# Patient Record
Sex: MALE | Race: WHITE | NOT HISPANIC OR LATINO | Employment: FULL TIME | URBAN - METROPOLITAN AREA
[De-identification: names, ages, dates, MRNs, and addresses within clinical notes are randomized per-mention and may not be internally consistent; named-entity substitution may affect disease eponyms.]

---

## 2021-09-28 ENCOUNTER — NON-PROVIDER VISIT (OUTPATIENT)
Dept: OCCUPATIONAL MEDICINE | Facility: CLINIC | Age: 26
End: 2021-09-28

## 2021-09-28 DIAGNOSIS — Z02.1 PRE-EMPLOYMENT HEALTH SCREENING EXAMINATION: ICD-10-CM

## 2021-09-28 PROCEDURE — 8907 PR URINE COLLECT ONLY: Performed by: NURSE PRACTITIONER

## 2021-09-29 ENCOUNTER — OFFICE VISIT (OUTPATIENT)
Dept: OCCUPATIONAL MEDICINE | Facility: CLINIC | Age: 26
End: 2021-09-29

## 2021-09-29 VITALS
DIASTOLIC BLOOD PRESSURE: 78 MMHG | HEIGHT: 71 IN | SYSTOLIC BLOOD PRESSURE: 118 MMHG | HEART RATE: 86 BPM | WEIGHT: 200 LBS | RESPIRATION RATE: 16 BRPM | BODY MASS INDEX: 28 KG/M2

## 2021-09-29 DIAGNOSIS — Z02.4 ENCOUNTER FOR COMMERCIAL DRIVER MEDICAL EXAMINATION (CDME): ICD-10-CM

## 2021-09-29 PROCEDURE — 7100 PR DOT PHYSICAL: Performed by: NURSE PRACTITIONER

## 2021-09-29 NOTE — PROGRESS NOTES
[facility] No major medical history, no chronic conditions, no chronic medications. No history of asthma, heart disease, seizure disorder or syncopal episodes with activity. Please see the chart for further information, however cleared for 's license for 2 years without restrictions.

## 2022-04-01 ENCOUNTER — OFFICE VISIT (OUTPATIENT)
Dept: URGENT CARE | Facility: PHYSICIAN GROUP | Age: 27
End: 2022-04-01
Payer: COMMERCIAL

## 2022-04-01 VITALS
HEART RATE: 87 BPM | SYSTOLIC BLOOD PRESSURE: 128 MMHG | TEMPERATURE: 98.7 F | HEIGHT: 71 IN | WEIGHT: 200 LBS | DIASTOLIC BLOOD PRESSURE: 82 MMHG | OXYGEN SATURATION: 100 % | RESPIRATION RATE: 18 BRPM | BODY MASS INDEX: 28 KG/M2

## 2022-04-01 DIAGNOSIS — R50.9 FEVER, UNSPECIFIED FEVER CAUSE: ICD-10-CM

## 2022-04-01 DIAGNOSIS — J02.9 SORE THROAT: ICD-10-CM

## 2022-04-01 DIAGNOSIS — J02.0 STREP THROAT: Primary | ICD-10-CM

## 2022-04-01 PROCEDURE — 99203 OFFICE O/P NEW LOW 30 MIN: CPT | Performed by: PHYSICIAN ASSISTANT

## 2022-04-01 RX ORDER — AMOXICILLIN 500 MG/1
500 CAPSULE ORAL 2 TIMES DAILY
Qty: 20 CAPSULE | Refills: 0 | Status: SHIPPED | OUTPATIENT
Start: 2022-04-01 | End: 2022-04-11

## 2022-04-01 ASSESSMENT — ENCOUNTER SYMPTOMS
EYE DISCHARGE: 0
SWOLLEN GLANDS: 1
VOMITING: 0
SHORTNESS OF BREATH: 0
DIZZINESS: 1
COUGH: 1
FEVER: 0
CHILLS: 0
MYALGIAS: 1
DIARRHEA: 0
SPUTUM PRODUCTION: 1
NAUSEA: 0
SORE THROAT: 1
HEADACHES: 0

## 2022-04-01 NOTE — PROGRESS NOTES
Subjective     Giuseppe Asencio is a 26 y.o. male who presents with Pharyngitis and Cough (Sore throat fever 2 days ago 100.0f, dry cough irritating throat. Mucus in nose bright to dark yellow .)    Medications:    • Motrin    Allergies: Patient has no known allergies.    Problem List: Giuseppe Asencio does not have a problem list on file.    Surgical History:  No past surgical history on file.    Past Social Hx: Giuseppe Asencio  reports that he has been smoking. He has never used smokeless tobacco.     Past Family Hx:  Giuseppe Asencio family history is not on file.     Problem list, medications, and allergies reviewed by myself today in Epic.          Patient presents with:  Pharyngitis  Cough: Sore throat fever 2 days ago 100.0f, dry cough irritating throat. Mucus in nose bright to dark yellow .  Negative covid test.        Pharyngitis   This is a new problem. Episode onset: 2 days. The problem has been gradually worsening. The maximum temperature recorded prior to his arrival was 100.4 - 100.9 F. The fever has been present for 1 to 2 days. The pain is at a severity of 8/10. Associated symptoms include congestion, coughing, a plugged ear sensation and swollen glands. Pertinent negatives include no diarrhea, headaches, shortness of breath or vomiting. He has had exposure to strep. He has tried cool liquids, NSAIDs and gargles for the symptoms. The treatment provided no relief.       Review of Systems   Constitutional: Negative for chills and fever.   HENT: Positive for congestion and sore throat.    Eyes: Negative for discharge.   Respiratory: Positive for cough and sputum production. Negative for shortness of breath.    Cardiovascular: Negative for chest pain.   Gastrointestinal: Negative for diarrhea, nausea and vomiting.   Musculoskeletal: Positive for myalgias.   Skin: Negative for rash.   Neurological: Positive for dizziness. Negative for headaches.   All other systems reviewed and are  "negative.             Objective     /82 (BP Location: Left arm, Patient Position: Sitting, BP Cuff Size: Adult)   Pulse 87   Temp 37.1 °C (98.7 °F) (Temporal)   Resp 18   Ht 1.803 m (5' 11\")   Wt 90.7 kg (200 lb)   SpO2 100%   BMI 27.89 kg/m²      Physical Exam  Vitals and nursing note reviewed.   Constitutional:       General: He is not in acute distress.     Appearance: Normal appearance. He is well-developed and normal weight. He is not ill-appearing or toxic-appearing.   HENT:      Head: Normocephalic and atraumatic.      Right Ear: Tympanic membrane normal.      Left Ear: Tympanic membrane normal.      Nose: Mucosal edema, congestion and rhinorrhea present.      Mouth/Throat:      Lips: Pink.      Mouth: Mucous membranes are moist.      Pharynx: Uvula midline. Posterior oropharyngeal erythema present. No uvula swelling.   Eyes:      Extraocular Movements: Extraocular movements intact.      Conjunctiva/sclera: Conjunctivae normal.      Pupils: Pupils are equal, round, and reactive to light.   Cardiovascular:      Rate and Rhythm: Normal rate and regular rhythm.      Pulses: Normal pulses.      Heart sounds: Normal heart sounds.   Pulmonary:      Effort: Pulmonary effort is normal.      Breath sounds: Normal breath sounds. No decreased breath sounds.   Abdominal:      Palpations: Abdomen is soft.   Musculoskeletal:         General: Normal range of motion.      Cervical back: Normal range of motion and neck supple.   Lymphadenopathy:      Cervical: No cervical adenopathy.   Skin:     General: Skin is warm and dry.      Capillary Refill: Capillary refill takes less than 2 seconds.   Neurological:      General: No focal deficit present.      Mental Status: He is alert and oriented to person, place, and time.      Gait: Gait normal.   Psychiatric:         Mood and Affect: Mood normal.         Behavior: Behavior is cooperative.            Strep: positive           Assessment & Plan             1. Strep " throat  amoxicillin (AMOXIL) 500 MG Cap    POCT Rapid Strep A   2. Sore throat  amoxicillin (AMOXIL) 500 MG Cap    POCT Rapid Strep A   3. Fever, unspecified fever cause  amoxicillin (AMOXIL) 500 MG Cap    POCT Rapid Strep A     Patient was evaluated in clinic today while wearing appropriate personal protective equipment.      PT to begin prescription medications today as discussed for strep throat.     PT advised saltwater gargles/swishes  3-4 times daily until symptoms improve.     Motrin/Advil/Ibuprophen 600 mg every 6 hours as needed for pain or fever.    PT should follow up with PCP in 1-2 days for re-evaluation if symptoms have not improved.      Discussed red flags and reasons to return to UC or ED.      Pt and/or family verbalized understanding of diagnosis and follow up instructions and was offered informational handout on diagnosis.  PT discharged.

## 2022-04-01 NOTE — LETTER
April 1, 2022         Patient: Giuseppe Asencio   YOB: 1995   Date of Visit: 4/1/2022           To Whom it May Concern:    Giuseppe Asencio was seen in my clinic on 4/1/2022. He has been diagnosed with strep throat and is on antibiotics.  He may return to work on 04/02/2022.    If you have any questions or concerns, please don't hesitate to call.        Sincerely,           Kathy Mccoy P.A.-C.  Electronically Signed